# Patient Record
Sex: FEMALE | Race: WHITE
[De-identification: names, ages, dates, MRNs, and addresses within clinical notes are randomized per-mention and may not be internally consistent; named-entity substitution may affect disease eponyms.]

---

## 2018-04-26 ENCOUNTER — TRANSCRIPTION ENCOUNTER (OUTPATIENT)
Age: 58
End: 2018-04-26

## 2018-12-05 ENCOUNTER — TRANSCRIPTION ENCOUNTER (OUTPATIENT)
Age: 58
End: 2018-12-05

## 2019-10-30 ENCOUNTER — TRANSCRIPTION ENCOUNTER (OUTPATIENT)
Age: 59
End: 2019-10-30

## 2020-09-19 ENCOUNTER — TRANSCRIPTION ENCOUNTER (OUTPATIENT)
Age: 60
End: 2020-09-19

## 2021-10-14 ENCOUNTER — TRANSCRIPTION ENCOUNTER (OUTPATIENT)
Age: 61
End: 2021-10-14

## 2022-01-11 ENCOUNTER — APPOINTMENT (OUTPATIENT)
Dept: ULTRASOUND IMAGING | Facility: CLINIC | Age: 62
End: 2022-01-11
Payer: MEDICARE

## 2022-01-11 PROCEDURE — 76536 US EXAM OF HEAD AND NECK: CPT

## 2022-01-13 ENCOUNTER — TRANSCRIPTION ENCOUNTER (OUTPATIENT)
Age: 62
End: 2022-01-13

## 2022-02-11 ENCOUNTER — OUTPATIENT (OUTPATIENT)
Dept: OUTPATIENT SERVICES | Facility: HOSPITAL | Age: 62
LOS: 1 days | End: 2022-02-11

## 2022-02-11 DIAGNOSIS — R79.89 OTHER SPECIFIED ABNORMAL FINDINGS OF BLOOD CHEMISTRY: ICD-10-CM

## 2022-02-25 PROBLEM — Z00.00 ENCOUNTER FOR PREVENTIVE HEALTH EXAMINATION: Status: ACTIVE | Noted: 2022-02-25

## 2022-03-09 ENCOUNTER — OUTPATIENT (OUTPATIENT)
Dept: OUTPATIENT SERVICES | Facility: HOSPITAL | Age: 62
LOS: 1 days | End: 2022-03-09
Payer: MEDICARE

## 2022-03-09 DIAGNOSIS — R79.89 OTHER SPECIFIED ABNORMAL FINDINGS OF BLOOD CHEMISTRY: ICD-10-CM

## 2022-03-10 PROCEDURE — 78014 THYROID IMAGING W/BLOOD FLOW: CPT | Mod: 26

## 2022-05-16 ENCOUNTER — APPOINTMENT (OUTPATIENT)
Dept: ORTHOPEDIC SURGERY | Facility: CLINIC | Age: 62
End: 2022-05-16
Payer: MEDICARE

## 2022-05-16 PROCEDURE — 25600 CLTX DST RDL FX/EPHYS SEP WO: CPT

## 2022-05-16 PROCEDURE — 99203 OFFICE O/P NEW LOW 30 MIN: CPT

## 2022-05-16 NOTE — DISCUSSION/SUMMARY
[de-identified] : reviewed findings, anatomy and pathology  discussed and pt understands. questions answered, pt left pleased\par

## 2022-05-16 NOTE — HISTORY OF PRESENT ILLNESS
[Sudden] : sudden [Dull/Aching] : dull/aching [Throbbing] : throbbing [Intermittent] : intermittent [de-identified] : C/o pain in the left wrist. Problem started on 5/11/22 after the patient had a trip and fall at home. Patient went to [facility] ER 5/11/22 had XRs done. Patient complains of mild pain, she remains in a splint and is wearing a sling. Patient taking advil as needed for pain.  [] : no [de-identified] : 5/11/22 [de-identified] : ER

## 2022-05-16 NOTE — PHYSICAL EXAM
[Dorsal Wrist] : dorsal wrist [3___] : pinch 3[unfilled]/5 [] : motor and sensory function intact in radial, ulnar, and median nerve distribution [Left] : left wrist [Outside films reviewed] : Outside films reviewed [The fracture is in acceptable alignment. There is progression in healing seen] : The fracture is in acceptable alignment. There is progression in healing seen [FreeTextEntry3] : long arm sugar tong cast [de-identified] : all fingers [FreeTextEntry9] : cast and sling [de-identified] : pain [FreeTextEntry8] : cast seen [TWNoteComboBox7] : dorsiflexion 0 degrees [TWNoteComboBox4] : volarflexion 0 degrees [de-identified] : radial deviation 0 degrees [TWNoteComboBox9] : ulnar deviation 0 degrees [TWNoteComboBox6] : pronation 0 degrees [de-identified] : supination 0 degrees

## 2022-05-31 ENCOUNTER — APPOINTMENT (OUTPATIENT)
Dept: ORTHOPEDIC SURGERY | Facility: CLINIC | Age: 62
End: 2022-05-31
Payer: MEDICARE

## 2022-05-31 PROCEDURE — 73100 X-RAY EXAM OF WRIST: CPT | Mod: LT

## 2022-05-31 PROCEDURE — 99024 POSTOP FOLLOW-UP VISIT: CPT

## 2022-05-31 NOTE — PHYSICAL EXAM
[Dorsal Wrist] : dorsal wrist [3___] : pinch 3[unfilled]/5 [Left] : left wrist [Outside films reviewed] : Outside films reviewed [The fracture is in acceptable alignment. There is progression in healing seen] : The fracture is in acceptable alignment. There is progression in healing seen [] : no palpable mass or nodule [FreeTextEntry3] : long arm sugar tong cast, removed skin clear  [de-identified] : all fingers [FreeTextEntry9] : cast and sling [de-identified] : pain [FreeTextEntry8] : cast seen [TWNoteComboBox7] : dorsiflexion 0 degrees [TWNoteComboBox4] : volarflexion 0 degrees [de-identified] : radial deviation 0 degrees [TWNoteComboBox9] : ulnar deviation 0 degrees [TWNoteComboBox6] : pronation 0 degrees [de-identified] : supination 0 degrees

## 2022-06-23 ENCOUNTER — APPOINTMENT (OUTPATIENT)
Dept: ORTHOPEDIC SURGERY | Facility: CLINIC | Age: 62
End: 2022-06-23
Payer: MEDICARE

## 2022-06-23 PROCEDURE — 99024 POSTOP FOLLOW-UP VISIT: CPT

## 2022-06-23 PROCEDURE — 73110 X-RAY EXAM OF WRIST: CPT | Mod: LT

## 2022-06-23 NOTE — PHYSICAL EXAM
[Dorsal Wrist] : dorsal wrist [3___] : pinch 3[unfilled]/5 [Left] : left wrist [The fracture is in acceptable alignment. There is progression in healing seen] : The fracture is in acceptable alignment. There is progression in healing seen [] : no palpable mass or nodule [FreeTextEntry3] : short arm cast [de-identified] : all fingers [de-identified] : with motion of finger [FreeTextEntry9] : cast and sling [de-identified] : pain [FreeTextEntry8] : good bone formation [TWNoteComboBox7] : dorsiflexion 0 degrees [TWNoteComboBox4] : volarflexion 0 degrees [de-identified] : radial deviation 0 degrees [TWNoteComboBox9] : ulnar deviation 0 degrees [TWNoteComboBox6] : pronation 0 degrees [de-identified] : supination 0 degrees

## 2022-07-07 ENCOUNTER — APPOINTMENT (OUTPATIENT)
Dept: ORTHOPEDIC SURGERY | Facility: CLINIC | Age: 62
End: 2022-07-07

## 2022-07-07 PROCEDURE — 73100 X-RAY EXAM OF WRIST: CPT | Mod: LT

## 2022-07-07 PROCEDURE — 99024 POSTOP FOLLOW-UP VISIT: CPT

## 2022-07-07 NOTE — PHYSICAL EXAM
[Dorsal Wrist] : dorsal wrist [3___] : pinch 3[unfilled]/5 [Left] : left wrist [] : no palpable mass or nodule [The fracture is in acceptable alignment. There is progression in healing seen] : The fracture is in acceptable alignment. There is progression in healing seen [FreeTextEntry3] : short arm cast [de-identified] : all fingers [de-identified] : with motion of finger [FreeTextEntry9] : cast and sling [de-identified] : pain [FreeTextEntry8] : good bone formation. healing well\par healing well [TWNoteComboBox7] : dorsiflexion 0 degrees [TWNoteComboBox4] : volarflexion 0 degrees [de-identified] : radial deviation 0 degrees [TWNoteComboBox9] : ulnar deviation 0 degrees [TWNoteComboBox6] : pronation 0 degrees [de-identified] : supination 0 degrees

## 2022-08-18 ENCOUNTER — APPOINTMENT (OUTPATIENT)
Dept: ORTHOPEDIC SURGERY | Facility: CLINIC | Age: 62
End: 2022-08-18

## 2022-08-18 PROCEDURE — 99213 OFFICE O/P EST LOW 20 MIN: CPT

## 2022-08-18 PROCEDURE — 73100 X-RAY EXAM OF WRIST: CPT | Mod: LT

## 2022-08-18 NOTE — PHYSICAL EXAM
[Dorsal Wrist] : dorsal wrist [3___] : pinch 3[unfilled]/5 [Left] : left wrist [The fracture is in acceptable alignment. There is progression in healing seen] : The fracture is in acceptable alignment. There is progression in healing seen [] : no palpable mass or nodule [Degenerative change] : Degenerative change [FreeTextEntry3] : short arm cast [de-identified] : all fingers [de-identified] : with motion of finger [FreeTextEntry9] : cast and sling [de-identified] : pain [FreeTextEntry8] : good bone formation. healing well\par healing well [TWNoteComboBox7] : dorsiflexion 50 degrees [TWNoteComboBox4] : volarflexion 50 degrees [de-identified] : radial deviation 20 degrees [TWNoteComboBox9] : ulnar deviation 30 degrees [TWNoteComboBox6] : pronation 90 degrees [de-identified] : supination 90 degrees

## 2022-09-13 ENCOUNTER — FORM ENCOUNTER (OUTPATIENT)
Age: 62
End: 2022-09-13

## 2022-09-15 ENCOUNTER — APPOINTMENT (OUTPATIENT)
Dept: ORTHOPEDIC SURGERY | Facility: CLINIC | Age: 62
End: 2022-09-15

## 2022-09-15 DIAGNOSIS — S52.502A UNSPECIFIED FRACTURE OF THE LOWER END OF LEFT RADIUS, INITIAL ENCOUNTER FOR CLOSED FRACTURE: ICD-10-CM

## 2022-09-15 DIAGNOSIS — S52.602A UNSPECIFIED FRACTURE OF THE LOWER END OF LEFT RADIUS, INITIAL ENCOUNTER FOR CLOSED FRACTURE: ICD-10-CM

## 2022-09-15 PROCEDURE — 99213 OFFICE O/P EST LOW 20 MIN: CPT

## 2022-09-15 PROCEDURE — 73100 X-RAY EXAM OF WRIST: CPT | Mod: LT

## 2022-09-15 NOTE — PHYSICAL EXAM
[Dorsal Wrist] : dorsal wrist [3___] : pinch 3[unfilled]/5 [Left] : left wrist [The fracture is in acceptable alignment. There is progression in healing seen] : The fracture is in acceptable alignment. There is progression in healing seen [Degenerative change] : Degenerative change [] : no palpable mass or nodule [FreeTextEntry3] : short arm cast [de-identified] : all fingers [de-identified] : with motion of finger [FreeTextEntry9] : cast and sling [de-identified] : pain [FreeTextEntry8] : well healed\par healing well [TWNoteComboBox7] : dorsiflexion 70 degrees [TWNoteComboBox4] : volarflexion 60 degrees [de-identified] : radial deviation 10 degrees [TWNoteComboBox9] : ulnar deviation 30 degrees [TWNoteComboBox6] : pronation 90 degrees [de-identified] : supination 90 degrees

## 2022-09-15 NOTE — DISCUSSION/SUMMARY
[de-identified] : reviewed findings, anatomy and pathology  discussed and pt understands. questions answered, pt left pleased\par

## 2022-10-03 ENCOUNTER — FORM ENCOUNTER (OUTPATIENT)
Age: 62
End: 2022-10-03

## 2022-12-08 ENCOUNTER — NON-APPOINTMENT (OUTPATIENT)
Age: 62
End: 2022-12-08

## 2023-09-12 ENCOUNTER — NON-APPOINTMENT (OUTPATIENT)
Age: 63
End: 2023-09-12

## 2024-03-14 ENCOUNTER — NON-APPOINTMENT (OUTPATIENT)
Age: 64
End: 2024-03-14

## 2024-09-09 ENCOUNTER — APPOINTMENT (OUTPATIENT)
Dept: NEUROSURGERY | Facility: CLINIC | Age: 64
End: 2024-09-09
Payer: MEDICARE

## 2024-09-09 DIAGNOSIS — Z98.1 ARTHRODESIS STATUS: ICD-10-CM

## 2024-09-09 DIAGNOSIS — M54.2 CERVICALGIA: ICD-10-CM

## 2024-09-09 DIAGNOSIS — M47.816 SPONDYLOSIS W/OUT MYELOPATHY OR RADICULOPATHY, LUMBAR REGION: ICD-10-CM

## 2024-09-09 DIAGNOSIS — S46.819A STRAIN OF OTHER MUSCLES, FASCIA AND TENDONS AT SHOULDER AND UPPER ARM LEVEL, UNSPECIFIED ARM, INITIAL ENCOUNTER: ICD-10-CM

## 2024-09-09 DIAGNOSIS — M47.812 SPONDYLOSIS W/OUT MYELOPATHY OR RADICULOPATHY, CERVICAL REGION: ICD-10-CM

## 2024-09-09 PROCEDURE — 99203 OFFICE O/P NEW LOW 30 MIN: CPT

## 2024-09-09 RX ORDER — LEVOTHYROXINE SODIUM 0.03 MG/1
25 TABLET ORAL
Refills: 0 | Status: ACTIVE | COMMUNITY

## 2024-09-09 RX ORDER — IBANDRONATE SODIUM 150 MG/1
150 TABLET ORAL
Refills: 0 | Status: ACTIVE | COMMUNITY

## 2024-09-09 RX ORDER — FLUTICASONE PROPIONATE AND SALMETEROL 50; 250 UG/1; UG/1
250-50 POWDER RESPIRATORY (INHALATION)
Refills: 0 | Status: ACTIVE | COMMUNITY

## 2024-09-09 NOTE — REASON FOR VISIT
[New Patient Visit] : a new patient visit [FreeTextEntry1] : Pt is here with complaints of lumbar discomfort.

## 2024-09-09 NOTE — DATA REVIEWED
[de-identified] : Were reviewed of the cervical and lumbar spine -9/9/2024: No acute fracture/dislocation; straightening of the cervical lordosis with degenerative disc disease multilevel and cervical spondylosis.  Evidence of previous lumbar fusion with slight curve noted above the fusion along with degenerative disc disease.

## 2024-09-09 NOTE — PHYSICAL EXAM
[General Appearance - Alert] : alert [General Appearance - In No Acute Distress] : in no acute distress [General Appearance - Well Nourished] : well nourished [General Appearance - Well Developed] : well developed [General Appearance - Well-Appearing] : healthy appearing [] : normal voice and communication [Oriented To Time, Place, And Person] : oriented to person, place, and time [Impaired Insight] : insight and judgment were intact [Affect] : the affect was normal [Mood] : the mood was normal [Memory Recent] : recent memory was not impaired [Memory Remote] : remote memory was not impaired [Person] : oriented to person [Place] : oriented to place [Time] : oriented to time [Motor Tone] : muscle tone was normal in all four extremities [Motor Strength] : muscle strength was normal in all four extremities [Involuntary Movements] : no involuntary movements were seen [No Muscle Atrophy] : normal bulk in all four extremities [Motor Handedness Right-Handed] : the patient is right hand dominant [Abnormal Walk] : normal gait [Balance] : balance was intact [Straight-Leg Raise Test - Left] : straight leg raise of the left leg was negative [Straight-Leg Raise Test - Right] : straight leg raise  of the right leg was negative [FreeTextEntry1] : Mild left trapezius tenderness to palpation in the superior periscapular region

## 2024-09-09 NOTE — HISTORY OF PRESENT ILLNESS
[de-identified] : 64-year-old female presents to the neurosurgery office for an initial office visit for evaluation for her neck and back.  The patient was initially seen by a Dr. Silveira who was going to offer treatment for her neck and back pain.  X-rays were obtained, however unavailable for review today, however she reports that it was noted to have varying degrees of makenna and retrolisthesis.  The patient denies any upper or lower extremity weakness.  She does complain of some intermittent left upper extremity pain from the neck into the shoulder and periscapular region.  The patient is right-hand dominant.  She is active in the gym and with her daily activity.  The patient has no other complaints at present.

## 2024-09-09 NOTE — ASSESSMENT
[FreeTextEntry1] : Discussed the history, physical examination findings, and recent imaging with the patient with all questions answered.  Discussed that there is no role for further imaging at this time with an MRI in the absence of neurologic deficit.  The cervical spine does show spondylosis and varying degrees of spondylolisthesis, however no surgical intervention warranted at this time.  Physical therapy and pain management can be offered in the future and an MRI can be ordered in the future if neurologic symptoms develop.  She will follow-up with us as needed.

## 2025-03-05 ENCOUNTER — OFFICE (OUTPATIENT)
Dept: URBAN - METROPOLITAN AREA CLINIC 97 | Facility: CLINIC | Age: 65
Setting detail: OPHTHALMOLOGY
End: 2025-03-05
Payer: MEDICARE

## 2025-03-05 DIAGNOSIS — H40.013: ICD-10-CM

## 2025-03-05 DIAGNOSIS — H52.4: ICD-10-CM

## 2025-03-05 DIAGNOSIS — H25.13: ICD-10-CM

## 2025-03-05 DIAGNOSIS — H35.371: ICD-10-CM

## 2025-03-05 PROCEDURE — 92020 GONIOSCOPY: CPT | Performed by: OPHTHALMOLOGY

## 2025-03-05 PROCEDURE — 92004 COMPRE OPH EXAM NEW PT 1/>: CPT | Performed by: OPHTHALMOLOGY

## 2025-03-05 PROCEDURE — 92015 DETERMINE REFRACTIVE STATE: CPT | Performed by: OPHTHALMOLOGY

## 2025-03-05 ASSESSMENT — REFRACTION_MANIFEST
OS_VA2: 20/20
OD_ADD: +2.75
OS_AXIS: 005
OS_SPHERE: +0.50
OD_SPHERE: +0.50
OS_VA2: 20/20
OS_AXIS: 005
OS_ADD: +3.00
OS_VA1: 20/20-2
OU_VA: 20/20-1
OS_CYLINDER: +1.25
OS_CYLINDER: +1.25
OD_VA2: 20/20
OD_ADD: +3.00
OD_SPHERE: +0.50
OD_CYLINDER: +0.50
OS_SPHERE: +0.50
OD_AXIS: 015
OS_ADD: +2.75
OD_CYLINDER: +0.50
OU_VA: 20/20-1
OD_AXIS: 015
OD_VA1: 20/20-1
OD_VA1: 20/20-1
OD_VA2: 20/20
OS_VA1: 20/20-2

## 2025-03-05 ASSESSMENT — KERATOMETRY
OD_AXISANGLE_DEGREES: 090
OS_AXISANGLE_DEGREES: 020
OS_K1POWER_DIOPTERS: 43.00
OD_K2POWER_DIOPTERS: 43.25
OS_K2POWER_DIOPTERS: 43.50
OD_K1POWER_DIOPTERS: 43.25
METHOD_AUTO_MANUAL: AUTO

## 2025-03-05 ASSESSMENT — CONFRONTATIONAL VISUAL FIELD TEST (CVF)
OS_FINDINGS: FULL
OD_FINDINGS: FULL

## 2025-03-05 ASSESSMENT — REFRACTION_AUTOREFRACTION
OD_AXIS: 012
OD_SPHERE: +0.75
OS_CYLINDER: +1.25
OD_CYLINDER: +0.75
OS_SPHERE: +0.75
OS_AXIS: 005

## 2025-03-05 ASSESSMENT — VISUAL ACUITY
OD_BCVA: 20/30-1
OS_BCVA: 20/40-1

## 2025-03-05 ASSESSMENT — REFRACTION_CURRENTRX
OD_OVR_VA: 20/
OS_OVR_VA: 20/
OS_VPRISM_DIRECTION: PROGS
OS_CYLINDER: +1.00
OD_ADD: +2.25
OS_SPHERE: +1.00
OD_CYLINDER: +0.50
OS_ADD: +2.25
OD_AXIS: 169
OS_AXIS: 171
OD_VPRISM_DIRECTION: PROGS
OD_SPHERE: +1.25

## 2025-03-05 ASSESSMENT — TONOMETRY
OS_IOP_MMHG: 19
OD_IOP_MMHG: 16

## 2025-03-16 ENCOUNTER — NON-APPOINTMENT (OUTPATIENT)
Age: 65
End: 2025-03-16